# Patient Record
Sex: MALE | Race: WHITE | NOT HISPANIC OR LATINO | URBAN - NONMETROPOLITAN AREA
[De-identification: names, ages, dates, MRNs, and addresses within clinical notes are randomized per-mention and may not be internally consistent; named-entity substitution may affect disease eponyms.]

---

## 2023-05-10 ENCOUNTER — OFFICE VISIT (OUTPATIENT)
Dept: CARDIOLOGY | Facility: CLINIC | Age: 43
End: 2023-05-10
Payer: COMMERCIAL

## 2023-05-10 VITALS
OXYGEN SATURATION: 98 % | HEART RATE: 67 BPM | HEIGHT: 69 IN | SYSTOLIC BLOOD PRESSURE: 123 MMHG | BODY MASS INDEX: 22.25 KG/M2 | DIASTOLIC BLOOD PRESSURE: 80 MMHG | WEIGHT: 150.2 LBS

## 2023-05-10 DIAGNOSIS — R94.31 ABNORMAL EKG: ICD-10-CM

## 2023-05-10 DIAGNOSIS — R07.89 CHEST PAIN, ATYPICAL: Primary | ICD-10-CM

## 2023-05-10 PROCEDURE — 99203 OFFICE O/P NEW LOW 30 MIN: CPT | Performed by: NURSE PRACTITIONER

## 2023-05-10 PROCEDURE — 1160F RVW MEDS BY RX/DR IN RCRD: CPT | Performed by: NURSE PRACTITIONER

## 2023-05-10 PROCEDURE — 93000 ELECTROCARDIOGRAM COMPLETE: CPT | Performed by: NURSE PRACTITIONER

## 2023-05-10 PROCEDURE — 1159F MED LIST DOCD IN RCRD: CPT | Performed by: NURSE PRACTITIONER

## 2023-05-10 RX ORDER — ASPIRIN 81 MG/1
81 TABLET ORAL DAILY
COMMUNITY

## 2023-05-10 RX ORDER — OMEPRAZOLE 20 MG/1
1 CAPSULE, DELAYED RELEASE ORAL DAILY
COMMUNITY
Start: 2023-04-19

## 2023-05-10 NOTE — PROGRESS NOTES
Subjective     Misbah Tobin is a 42 y.o. male who presents to day for Chest Pain and Abnormal ECG.    CHIEF COMPLIANT  Chief Complaint   Patient presents with   • Chest Pain   • Abnormal ECG       Active Problems:  Problem List Items Addressed This Visit    None  Visit Diagnoses     Chest pain, atypical    -  Primary    Abnormal EKG              HPI  HPI   Misbah Tobin is a 42-year-old male patient being seen today to establish care for cardiac evaluation due to chest pain and abnormal electrocardiogram. He is accompanied by an adult female.    The patient reports that he has been having chest pain. The adult female states that Dr. Shah prescribed him omeprazole and his chest pain improved and has completely resolved. The patient reports that he was experiencing chest pain daily for approximately 1 week before he started taking omeprazole. He describes the chest pain as sharp. He states that it felt like he had over exerted himself and his muscles were hurting. He reports that the chest pain did not  worsen with activity. He states that it was more at random and after eating certain foods. He reports that nothing seems to make it better or worse. He states that the chest pain would last approximately 30 minutes to 1 hour. His blood pressure and heart rate are normal today.The patient reports that his shoulder is hurting a little bit today. He reports that he switched jobs in the last couple of days and he is doing something different that he normally does.  He feels that this is musculoskeletal in nature and is sore with movement.    Current medications include an enteric-coated aspirin 81 mg and omeprazole. Regarding family history of heart issues, his mother had a heart attack in her 60s.    He denies any current chest pain, shortness of breath, lower extremity edema, palpitations, fatigue, dizziness, lightheadedness, syncope, PND, orthopnea, or strokelike symptoms.    PRIOR MEDS  Current Outpatient Medications on  File Prior to Visit   Medication Sig Dispense Refill   • aspirin 81 MG EC tablet Take 1 tablet by mouth Daily.     • omeprazole (priLOSEC) 20 MG capsule Take 1 capsule by mouth Daily.       No current facility-administered medications on file prior to visit.       ALLERGIES  Patient has no known allergies.    HISTORY  Past Medical History:   Diagnosis Date   • GERD (gastroesophageal reflux disease)        Social History     Socioeconomic History   • Marital status: Single   Tobacco Use   • Smoking status: Every Day     Packs/day: 1.00     Years: 20.00     Pack years: 20.00     Types: Cigarettes   • Smokeless tobacco: Current     Types: Chew   Vaping Use   • Vaping Use: Never used   Substance and Sexual Activity   • Alcohol use: Yes     Comment: 2 bottles per night   • Drug use: Not Currently     Types: Methamphetamines     Comment: opiods   • Sexual activity: Defer       Family History   Problem Relation Age of Onset   • Heart attack Mother    • Hypertension Mother    • Diabetes Mother    • Hypertension Sister        Review of Systems   Constitutional: Negative for chills, fatigue and fever.   HENT: Negative for congestion, rhinorrhea and sore throat.    Eyes: Negative for visual disturbance.   Respiratory: Negative for chest tightness and shortness of breath.    Cardiovascular: Positive for chest pain (has resolved was sharp in nature did not radiate). Negative for palpitations and leg swelling.   Gastrointestinal: Negative for constipation, diarrhea and nausea.   Genitourinary: Negative for dysuria, frequency and urgency.   Musculoskeletal: Positive for back pain and neck pain. Negative for arthralgias.   Allergic/Immunologic: Negative for environmental allergies and food allergies.   Neurological: Negative for dizziness, syncope, weakness and light-headedness.   Hematological: Does not bruise/bleed easily.   Psychiatric/Behavioral: Negative for sleep disturbance.       Objective     VITALS: /80 (BP  "Location: Left arm, Patient Position: Sitting, Cuff Size: Adult)   Pulse 67   Ht 175.3 cm (69\")   Wt 68.1 kg (150 lb 3.2 oz)   SpO2 98%   BMI 22.18 kg/m²     LABS:   Lab Results (most recent)     None          IMAGING:   No Images in the past 120 days found..    EXAM:  Physical Exam  Vitals and nursing note reviewed.   Constitutional:       Appearance: He is well-developed.   HENT:      Head: Normocephalic and atraumatic.   Eyes:      Pupils: Pupils are equal, round, and reactive to light.   Neck:      Vascular: No carotid bruit or JVD.   Cardiovascular:      Rate and Rhythm: Normal rate and regular rhythm.      Pulses:           Carotid pulses are 2+ on the right side and 2+ on the left side.       Radial pulses are 2+ on the right side and 2+ on the left side.        Posterior tibial pulses are 2+ on the right side and 2+ on the left side.      Heart sounds: Normal heart sounds. No murmur heard.    No gallop.   Pulmonary:      Effort: Pulmonary effort is normal. No respiratory distress.      Breath sounds: Normal breath sounds.   Abdominal:      General: Bowel sounds are normal. There is no distension.      Palpations: Abdomen is soft.      Tenderness: There is no abdominal tenderness.   Musculoskeletal:         General: No swelling. Normal range of motion.      Cervical back: Neck supple.   Skin:     General: Skin is warm and dry.   Neurological:      Mental Status: He is alert and oriented to person, place, and time.      Cranial Nerves: No cranial nerve deficit.      Sensory: No sensory deficit.   Psychiatric:         Speech: Speech normal.         Behavior: Behavior normal.         Thought Content: Thought content normal.         Judgment: Judgment normal.         Procedure     ECG 12 Lead    Date/Time: 5/10/2023 2:13 PM  Performed by: Geovanni Espino APRN  Authorized by: Geovanni Espino APRN   Comparison: compared with previous ECG from 3/22/2023  Similar to previous ECG  Rhythm: sinus " bradycardia  Rate: bradycardic  BPM: 59  QRS axis: right  Comments: QTc 399 ms  No acute changes               Assessment & Plan    Diagnosis Plan   1. Chest pain, atypical        2. Abnormal EKG           PLAN  1. The patient's recent EKG results were discussed in full detail with him.  2.  Patient previously had chest pain that completely resolved with initiation of omeprazole.  We did discuss the EKG changes that were on his EKG as well as further evaluation with stress test/echocardiogram.  However due to complete resolution of his chest pain he wishes to defer testing at this time.  Did encourage him to contact us if symptoms recur an ischemic work-up can be pursued at that time.  3.  Informed of signs and symptoms of ACS and advised to seek emergent treatment for any new worsening symptoms.  Patient also advised sooner follow-up as needed.  Also advised to follow-up with family doctor as needed  This note is dictated utilizing voice recognition software.  Although this record has been proof read, transcriptional errors may still be present. If questions occur regarding the content of this record please do not hesitate to call our office.  I have reviewed and confirmed the accuracy of the ROS as documented by the MA/LPN/RN SHELDON Carrasco    Assessment  1. Chest pain  2. Abnormal ECG    Return in about 9 months (around 2/10/2024), or if symptoms worsen or fail to improve.    Diagnoses and all orders for this visit:    1. Chest pain, atypical (Primary)    2. Abnormal EKG    Other orders  -     ECG 12 Lead        Misbah Tobin  reports that he has been smoking cigarettes. He has a 20.00 pack-year smoking history. His smokeless tobacco use includes chew.. I have educated him on the risk of diseases from using tobacco products.        MEDS ORDERED DURING VISIT:  No orders of the defined types were placed in this encounter.          This document has been electronically signed by Geovanni Espino Jr., SHELDON  May 11,  2023 05:33 EDT    Transcribed from ambient dictation for SHELDON Carrasco by Kortney Tobin.  05/10/23   17:06 EDT    Patient or patient representative verbalized consent to the visit recording.  I have personally performed the services described in this document as transcribed by the above individual, and it is both accurate and complete.